# Patient Record
Sex: MALE | Race: WHITE | Employment: UNEMPLOYED | URBAN - METROPOLITAN AREA
[De-identification: names, ages, dates, MRNs, and addresses within clinical notes are randomized per-mention and may not be internally consistent; named-entity substitution may affect disease eponyms.]

---

## 2017-06-01 PROBLEM — F10.129 ALCOHOL ABUSE WITH INTOXICATION, UNSPECIFIED (HCC): Status: ACTIVE | Noted: 2017-06-01

## 2017-06-01 NOTE — ED INITIAL ASSESSMENT (HPI)
Pt states he needs a medical clearance for ETOH detox for YOBANY. Pt was sent here because his BP was elevated at [facility]. Pt denies any chest pain, blurred vision, headache, TRUPTI, or pain/discomfort.

## 2017-06-01 NOTE — ED NOTES
Discharge instructions were reviewed and pt verbalized understanding. Pt is ambulatory with steady gait. Pt denies any pain/discomfort at this time. Pt is going to [facility] at this time with family. YOBANY is aware of pt going over.

## 2017-06-01 NOTE — BH LEVEL OF CARE ASSESSMENT
Level of Care Assessment Note    General Questions  Why are you here?: pt states his wife asked him to come to [facility] because he had a drink earlier today and has had issues with alcohol in the past. Pt denies SI/HI.   Precipitating Events: Pt reports \"yeah, Attempt: No  Past Suicide Risk Mitigating Factors: Family, job  Past Suicide Risk Collateral Provided By[de-identified] Wife  Describe Past Suicide Risk Collateral: Agrees    Danger to Others/Property  Current/Recent Harm Toward Others: No  Past Harm Toward Others: No Current/Previous MH/CD Providers  Hospitalizations, Placements, Therapy, Detox: Yes     Current Therapist  Current Therapist: Cristal Oscar.   Dates of Treatment: 1 appt this week  Date Last Seen: 1 appt this past week  Reason: substance abuse counselorchristiane diarrhea, tremors, diaphoresis on Mar 31, 2017)  Current Withdrawal Symptoms: No  Breathalyzer (when indicated): 0.136  Process Addiction/ Behaviors  Repetitive/Compulsive Behaviors in the past 30 days: No                Functional Impairment  Currently At age  Inpatient Criteria: Medical detox;24 hr behavior monitoring;Severely decreased function  Precautions: Close Observation;Fall;Seizure  Refused Treatment: No  Refused Treatment Reason:  (ED 1st)  Education Provided: Call 911 in an Emergency;[facility]

## 2017-06-01 NOTE — ED PROVIDER NOTES
Patient Seen in: [facility] Emergency Department    History   Patient presents with:  Hypertension (cardiovascular)  Medical Clearance (constitutional)    Stated Complaint: high blood pressure-medical clearance    HPI    33-year-old male presents to Comment: fifthof vodka every 4-5 days      Review of Systems    Positive for stated complaint: high blood pressure-medical clearance  Other systems are as noted in HPI. Constitutional and vital signs reviewed.       All other systems reviewed and negative Urine Drug Screen should be used only for medical purposes. CBC WITH DIFFERENTIAL WITH PLATELET    Narrative: The following orders were created for panel order CBC WITH DIFFERENTIAL WITH PLATELET.   Procedure                               Abnormality

## 2020-10-13 PROBLEM — H25.89 OTHER AGE-RELATED CATARACT: Status: ACTIVE | Noted: 2020-10-13

## 2020-10-17 ENCOUNTER — APPOINTMENT (OUTPATIENT)
Dept: LAB | Facility: HOSPITAL | Age: 65
End: 2020-10-17
Attending: OPHTHALMOLOGY
Payer: COMMERCIAL

## 2020-10-17 DIAGNOSIS — Z01.818 PRE-PROCEDURAL EXAMINATION: ICD-10-CM

## 2021-04-01 DIAGNOSIS — Z23 NEED FOR VACCINATION: ICD-10-CM

## 2022-01-06 PROBLEM — H25.89 OTHER AGE-RELATED CATARACT: Status: RESOLVED | Noted: 2020-10-13 | Resolved: 2022-01-06

## (undated) NOTE — ED AVS SNAPSHOT
[facility] Emergency Department    [place]  [address] [address]    Phone:  [phone]    Fax:  [phone]           Bettina Coe   MRN: [id number]    Department:  [facility] Emergency Department   Date of Visit:  5/31 Disclosure     Insurance plans vary and the physician(s) referred by the ER may not be covered by your plan. Please contact your insurance company to determine coverage for follow-up care and referrals.     [address] [facility] [employer] [place] [phone] prescription right away and begin taking the medication(s) as directed    If the emergency physician has read X-rays, these will be re-interpreted by a radiologist.  If there is a significant change in your reading, you will be contacted.  Please make sure Medicaid plans. To get signed up and covered, please call [phone] and ask to get set up for an insurance coverage that is in-network with [facility].         MyChart     Visit [employer]  You can access your MyChart to more actively manage

## (undated) NOTE — ED AVS SNAPSHOT
[facility] Emergency Department    [place]  [address] [address]    Phone:  [phone]    Fax:  [phone]           Manasa Chavira   MRN: [id number]    Department:  [facility] Emergency Department   Date of Visit:  5/31 IF THERE IS ANY CHANGE OR WORSENING OF YOUR CONDITION, CALL YOUR PRIMARY CARE PHYSICIAN AT ONCE OR RETURN IMMEDIATELY TO THE EMERGENCY DEPARTMENT.     If you have been prescribed any medication(s), please fill your prescription right away and begin taking t

## (undated) NOTE — Clinical Note
Transfer back to [facility] for psychiatric hospitalization for alcohol detox. The patient is medically clear for psychiatric hospitalization.